# Patient Record
Sex: MALE | Race: ASIAN | NOT HISPANIC OR LATINO | URBAN - METROPOLITAN AREA
[De-identification: names, ages, dates, MRNs, and addresses within clinical notes are randomized per-mention and may not be internally consistent; named-entity substitution may affect disease eponyms.]

---

## 2019-05-31 ENCOUNTER — EMERGENCY (EMERGENCY)
Facility: HOSPITAL | Age: 59
LOS: 1 days | Discharge: HOME | End: 2019-05-31
Attending: EMERGENCY MEDICINE | Admitting: EMERGENCY MEDICINE
Payer: MEDICAID

## 2019-05-31 VITALS
RESPIRATION RATE: 16 BRPM | OXYGEN SATURATION: 100 % | HEART RATE: 60 BPM | DIASTOLIC BLOOD PRESSURE: 80 MMHG | SYSTOLIC BLOOD PRESSURE: 119 MMHG | TEMPERATURE: 98 F

## 2019-05-31 VITALS
DIASTOLIC BLOOD PRESSURE: 82 MMHG | RESPIRATION RATE: 16 BRPM | HEART RATE: 67 BPM | SYSTOLIC BLOOD PRESSURE: 115 MMHG | TEMPERATURE: 98 F | OXYGEN SATURATION: 100 %

## 2019-05-31 PROCEDURE — 71101 X-RAY EXAM UNILAT RIBS/CHEST: CPT | Mod: 26,RT

## 2019-05-31 PROCEDURE — 71046 X-RAY EXAM CHEST 2 VIEWS: CPT | Mod: 26,59

## 2019-05-31 PROCEDURE — 73120 X-RAY EXAM OF HAND: CPT | Mod: 26,RT

## 2019-05-31 PROCEDURE — 99284 EMERGENCY DEPT VISIT MOD MDM: CPT

## 2019-05-31 RX ORDER — KETOROLAC TROMETHAMINE 30 MG/ML
30 SYRINGE (ML) INJECTION ONCE
Refills: 0 | Status: DISCONTINUED | OUTPATIENT
Start: 2019-05-31 | End: 2019-05-31

## 2019-05-31 RX ORDER — TETANUS TOXOID, REDUCED DIPHTHERIA TOXOID AND ACELLULAR PERTUSSIS VACCINE, ADSORBED 5; 2.5; 8; 8; 2.5 [IU]/.5ML; [IU]/.5ML; UG/.5ML; UG/.5ML; UG/.5ML
0.5 SUSPENSION INTRAMUSCULAR ONCE
Refills: 0 | Status: COMPLETED | OUTPATIENT
Start: 2019-05-31 | End: 2019-05-31

## 2019-05-31 RX ADMIN — Medication 30 MILLIGRAM(S): at 15:31

## 2019-05-31 RX ADMIN — TETANUS TOXOID, REDUCED DIPHTHERIA TOXOID AND ACELLULAR PERTUSSIS VACCINE, ADSORBED 0.5 MILLILITER(S): 5; 2.5; 8; 8; 2.5 SUSPENSION INTRAMUSCULAR at 15:11

## 2019-05-31 NOTE — ED PROVIDER NOTE - CARE PLAN
Principal Discharge DX:	Rib pain on right side  Secondary Diagnosis:	Abrasion of right hand, initial encounter

## 2019-05-31 NOTE — ED PROVIDER NOTE - ENMT, MLM
Airway patent, Nasal mucosa clear. Mouth with normal mucosa. Throat has no vesicles, no oropharyngeal exudates and uvula is midline.   Neck: Neck supple. FROM. NAD at rest.

## 2019-05-31 NOTE — ED PROVIDER NOTE - CLINICAL SUMMARY MEDICAL DECISION MAKING FREE TEXT BOX
57 y/o M with no significant PMHx presents to the ED c/o R hand abrasion and R sided chest pain s/p mechanical trip on the stairs due to foot missing a step today. Plan - Will obtain xray to r/o rib fx and hand. Low suspicions of hand fx.

## 2019-05-31 NOTE — ED ADULT TRIAGE NOTE - CHIEF COMPLAINT QUOTE
Pt C/O right flank pain and right arm pain S/P mechanical fall. Pt states walking up flight of stairs tripped on first step fell landing on right arm and flank. PT has abrasion to right hand with no active bleeding. Denies head trauma, LOC. Pt ambulatory in triage, appears comfortable and in NAD.

## 2019-05-31 NOTE — ED PROVIDER NOTE - GASTROINTESTINAL, MLM
No ecchymosis or skin break to the chest. Chest unlabored. Chest wall is tender 4th -6th rib midaxillary.

## 2019-05-31 NOTE — ED PROVIDER NOTE - OBJECTIVE STATEMENT
57 y/o M with no significant PMHx presents to the ED c/o R hand abrasion and R sided chest pain s/p mechanical trip on the stairs due to foot missing a step today. Pt states he fell forward and landed on the R sided chest on the end of the stairs. He c/o abrasion on the R hand. He was able to stand without assistance and ambulate. Additional complaint of immediate pain to the R chest with deep breath. No SOB at rest.   Denies LOC, head injury, dizziness, lightheadedness, neck or back pain. No other acute complaints at time of eval.

## 2019-05-31 NOTE — ED ADULT NURSE NOTE - OBJECTIVE STATEMENT
Receivd pt into spot #13 in intake area. Pt states was walking out of house and tripped on steps coming out of house, hitting right side of body on concrete steps. Pt c/o pain to right ribs area and right upper back into right upper chest. No obvious injury noted but pain is tender to any touch. Denies diff breathing but pain increases to ribs with deep breaths. Breathing unlabored and easy at rest. Lungs clear on auscultation. Daughter at bedside. Dr. Nunez in to eval pt.